# Patient Record
Sex: FEMALE | Race: WHITE | URBAN - METROPOLITAN AREA
[De-identification: names, ages, dates, MRNs, and addresses within clinical notes are randomized per-mention and may not be internally consistent; named-entity substitution may affect disease eponyms.]

---

## 2017-04-11 ENCOUNTER — COMMUNICATION - HEALTHEAST (OUTPATIENT)
Dept: TELEHEALTH | Facility: CLINIC | Age: 22
End: 2017-04-11

## 2017-04-11 ENCOUNTER — OFFICE VISIT - HEALTHEAST (OUTPATIENT)
Dept: FAMILY MEDICINE | Facility: CLINIC | Age: 22
End: 2017-04-11

## 2017-04-11 DIAGNOSIS — Z30.018 ENCOUNTER FOR INITIAL PRESCRIPTION OF OTHER CONTRACEPTIVES: ICD-10-CM

## 2017-04-11 RX ORDER — NORGESTIMATE AND ETHINYL ESTRADIOL 0.25-0.035
1 KIT ORAL DAILY
Qty: 3 PACKAGE | Refills: 3 | Status: SHIPPED | OUTPATIENT
Start: 2017-04-11

## 2017-04-11 ASSESSMENT — MIFFLIN-ST. JEOR: SCORE: 1388.64

## 2021-05-30 VITALS — HEIGHT: 66 IN | BODY MASS INDEX: 21.86 KG/M2 | WEIGHT: 136 LBS

## 2021-06-10 NOTE — PROGRESS NOTES
ASSESSMENT AND PLAN:  We spent 25 minutes today in direct patient contact, 100% of the time in consultation concerning medical problems as listed below.   CONTRACEPTION  Pt is wanting contraception - but is not sexually active. She is non smoker, no history of migraines or DVT's.  Discussed IUD, nexplano, depo, nuva ring, orthoevra patch, pills, and condoms, essure, tubal, vasectomy.  Discussed that only discretion and condoms can prevent stds.  She opted to start ocp.     The use of the oral contraceptive has been fully discussed with the patient. This includes the proper method to initiate (i.e. Sunday start after next normal menstrual onset) and continue the pills, the need for regular compliance to ensure adequate contraceptive effect, the physiology which make the pill effective, the instructions for what to do in event of a missed pill, and warnings about anticipated minor side effects such as breakthrough spotting, nausea, breast tenderness, weight changes, acne, headaches, etc. She has been told of the more serious potential side effects such as MI, stroke, and deep vein thrombosis, all of which are very unlikely. She has been asked to report any signs of such serious problems immediately. She should back up the pill with a condom during any cycle in which antibiotics are prescribed, and during the first cycle as well. The need for additional protection, such as a condom, to prevent exposure to sexually transmitted diseases has also been discussed- the patient has been clearly reminded that OCP's cannot protect her against diseases such as HIV, Herpes and others. She understands and wishes to take the medication as prescribed. She will follow up prn.      Chief Complaint   Patient presents with     Contraception     HPI  Soco Adams is a 21 y.o. female comes in for contraception. She is not sexually active, wants something to decrease the intensity of menstrual cramps and intensity and duration of  "bleeding. Interested in trying ocp for this.     She also has mood swings with her periods and heard that ocp might be helpful.    History   Smoking Status     Never Smoker   Smokeless Tobacco     Never Used      No current outpatient prescriptions on file.     No current facility-administered medications for this visit.      No Known Allergies  Review of Systems   Constitutional: Negative.    HENT: Negative.    Eyes: Negative.    Respiratory: Negative.    Cardiovascular: Negative.    Gastrointestinal: Negative.    Endocrine: Negative.    Genitourinary: Negative.    Musculoskeletal: Negative.    Skin: Negative.    Neurological: Negative.    Hematological: Negative.    Psychiatric/Behavioral: Negative.          OBJECTIVE: BP 94/60  Pulse 64  Resp 14  Ht 5' 6\" (1.676 m)  Wt 136 lb (61.7 kg)  LMP 03/25/2017 (Exact Date)  Breastfeeding? No  BMI 21.95 kg/m2  Physical Exam   Constitutional: She is oriented to person, place, and time. She appears well-developed and well-nourished.   HENT:   Head: Normocephalic and atraumatic.   Eyes: Conjunctivae are normal.   Cardiovascular: Normal rate and regular rhythm.    Pulmonary/Chest: Effort normal.   Neurological: She is alert and oriented to person, place, and time.   Skin: Skin is warm and dry.   Psychiatric: She has a normal mood and affect.      "